# Patient Record
Sex: MALE | Race: NATIVE HAWAIIAN OR OTHER PACIFIC ISLANDER | Employment: FULL TIME | URBAN - METROPOLITAN AREA
[De-identification: names, ages, dates, MRNs, and addresses within clinical notes are randomized per-mention and may not be internally consistent; named-entity substitution may affect disease eponyms.]

---

## 2024-09-05 ENCOUNTER — HOSPITAL ENCOUNTER (OUTPATIENT)
Age: 29
Discharge: HOME | End: 2024-09-05
Payer: COMMERCIAL

## 2024-09-05 VITALS
HEART RATE: 70 BPM | OXYGEN SATURATION: 100 % | TEMPERATURE: 97 F | DIASTOLIC BLOOD PRESSURE: 79 MMHG | RESPIRATION RATE: 16 BRPM | SYSTOLIC BLOOD PRESSURE: 122 MMHG

## 2024-09-05 DIAGNOSIS — H92.01 ACUTE OTALGIA, RIGHT: ICD-10-CM

## 2024-09-05 DIAGNOSIS — H71.91 CHOLESTEATOMA OF RIGHT EAR: Primary | ICD-10-CM

## 2024-09-05 PROCEDURE — 99203 OFFICE O/P NEW LOW 30 MIN: CPT | Performed by: PHYSICIAN ASSISTANT

## 2024-09-05 RX ORDER — PANTOPRAZOLE SODIUM 40 MG/1
1 TABLET, DELAYED RELEASE ORAL DAILY
COMMUNITY
Start: 2024-03-12

## 2024-09-05 RX ORDER — SILVER SULFADIAZINE 10 MG/G
CREAM TOPICAL ONCE
Status: DISCONTINUED | OUTPATIENT
Start: 2024-09-05 | End: 2024-09-05

## 2024-09-05 RX ORDER — OFLOXACIN 3 MG/ML
10 SOLUTION AURICULAR (OTIC) DAILY
Qty: 10 ML | Refills: 0 | Status: SHIPPED | OUTPATIENT
Start: 2024-09-05 | End: 2024-09-15

## 2024-09-05 NOTE — DISCHARGE INSTRUCTIONS
A cholesteatoma is a noncancerous skin growth in the middle section of your ear, behind the eardrum. It can be a birth defect or result from repeated middle ear infections. Besides repeated infections, a cholesteatoma may also be caused by a poorly functioning eustachian tube, which is the tube that leads from the back of the nose to the middle of the ear.    The eustachian tube allows air to flow through the ear and equalize ear pressure. It may not work properly due to any of the following:    chronic ear infections  sinus infections  colds  allergies

## 2024-09-05 NOTE — ED PROVIDER NOTES
Patient Seen in: [facility]      History   No chief complaint on file.    Stated Complaint: ear pain    Subjective:   HPI    Patient is a 29-year-old male, presenting to immediate care for evaluation of acute atraumatic right ear pain for 3 days.  Associated: white clumpy debris.  Unsure if possible earwax buildup or ear infection.  History of ear tube placement as a child at age of 5.  States has had prior staph infections in the ear.  Coming to immediate care for further evaluation.  Denies any fevers.  No URI symptoms.  No ear swelling, redness, hearing loss, ringing in ears.  No recent travel or sick contacts.  Denies history of environmental allergies.    Objective:   Past Medical History:    Esophageal reflux              History reviewed. No pertinent surgical history.             Social History     Socioeconomic History    Marital status: Single   Tobacco Use    Smoking status: Never     Passive exposure: Never    Smokeless tobacco: Never   Vaping Use    Vaping status: Never Used   Substance and Sexual Activity    Alcohol use: Yes     Comment: 1    Drug use: Never     Social Determinants of Health      Received from [facility]    Housing Stability              Review of Systems   Constitutional:  Negative for fever.   HENT:  Positive for ear discharge and ear pain. Negative for congestion.    Respiratory:  Negative for cough.    Allergic/Immunologic: Negative for immunocompromised state.   Neurological:  Negative for dizziness, weakness, light-headedness and headaches.   Psychiatric/Behavioral:  Negative for confusion.        Positive for stated Chief Complaint: No chief complaint on file.    Other systems are as noted in HPI.  Constitutional and vital signs reviewed.      All other systems reviewed and negative except as noted above.    Physical Exam     ED Triage Vitals [09/05/24 0858]   /79   Pulse 70   Resp 16   Temp 97.3 °F (36.3 °C)   Temp src Temporal   SpO2  100 %   O2 Device None (Room air)       Current Vitals:   Vital Signs  BP: 122/79  Pulse: 70  Resp: 16  Temp: 97.3 °F (36.3 °C)  Temp src: Temporal    Oxygen Therapy  SpO2: 100 %  O2 Device: None (Room air)            Physical Exam  Vitals and nursing note reviewed.   Constitutional:       General: He is not in acute distress.     Appearance: Normal appearance. He is not ill-appearing, toxic-appearing or diaphoretic.   HENT:      Head: Normocephalic and atraumatic.      Ears:      Comments: TM bilaterally without erythema or effusion.  Cholesteoma of right ear canal.         Mouth/Throat:      Mouth: Mucous membranes are moist.   Eyes:      Conjunctiva/sclera: Conjunctivae normal.   Cardiovascular:      Rate and Rhythm: Normal rate.      Pulses: Normal pulses.   Pulmonary:      Effort: No respiratory distress.   Musculoskeletal:         General: Normal range of motion.      Cervical back: Normal range of motion and neck supple. No rigidity.   Neurological:      General: No focal deficit present.      Mental Status: He is alert and oriented to person, place, and time.      Motor: No weakness.      Coordination: Coordination normal.      Gait: Gait normal.   Psychiatric:         Mood and Affect: Mood normal.         Behavior: Behavior normal.             ED Course   Labs Reviewed - No data to display           MDM     Patient is a 29-year-old male, presenting to immediate care for evaluation of right ear pain for 2 days.  Exam consistent with choloesteatoma of right ear. Ear irrigation performed with improvement with debris removed.  Scant bleeding ear canal noted.  Silver nitrate required for hemostasis.  Reevaluation no recurrent bleeding.  Will treat outpatient supportively.  Rx Debrox for earwax buildup.  Prescription for ofloxacin otic drops for bacterial infection prophylactically.  Definitive management/treatment for cholesteatoma of right ear with ENT follow-up.      Medical Decision Making      Disposition  and Plan     Clinical Impression:  1. Cholesteatoma of right ear    2. Acute otalgia, right         Disposition:  Discharge  9/5/2024  9:55 am    Follow-up:  Jaya Armenta MD  [address] [place] [address] [id number] [phone]      ENT (Ear Nose and Throat ) Doctor, As needed          Medications Prescribed:  Current Discharge Medication List        START taking these medications    Details   ofloxacin 0.3 % Otic Solution Place 10 drops into the right ear daily for 10 days.  Qty: 10 mL, Refills: 0      carbamide peroxide 6.5 % Otic Solution Place 5 drops into the right ear 2 (two) times daily as needed (earwax build-up).  Qty: 15 mL, Refills: 0